# Patient Record
(demographics unavailable — no encounter records)

---

## 2024-10-29 NOTE — HISTORY OF PRESENT ILLNESS
[FreeTextEntry1] : He denies chest pain, shortness of breath He further denies any new dizziness  He complains of difficulty sleeping, gets about 4 to 5 hours a night when he used to get up for 8 hours.  This issue started after he retired He is able to do all his chores however around the house and even helps his children with their house chores He does not nap His wife says he does get enough sleep  He reports feeling his left leg being cold when in bed at night No pain in the legs with walking

## 2024-10-29 NOTE — REVIEW OF SYSTEMS
[Feeling Fatigued] : feeling fatigued [Memory Lapses Or Loss] : memory lapses or loss [Negative] : Heme/Lymph [FreeTextEntry5] : see HPI [FreeTextEntry1] : excessive sweating